# Patient Record
Sex: MALE | NOT HISPANIC OR LATINO | URBAN - METROPOLITAN AREA
[De-identification: names, ages, dates, MRNs, and addresses within clinical notes are randomized per-mention and may not be internally consistent; named-entity substitution may affect disease eponyms.]

---

## 2020-11-11 ENCOUNTER — IMPORTED ENCOUNTER (OUTPATIENT)
Dept: URBAN - METROPOLITAN AREA CLINIC 1 | Facility: CLINIC | Age: 40
End: 2020-11-11

## 2020-11-11 PROBLEM — H43.813: Noted: 2020-11-11

## 2020-11-11 PROCEDURE — 92004 COMPRE OPH EXAM NEW PT 1/>: CPT

## 2020-11-11 PROCEDURE — 92015 DETERMINE REFRACTIVE STATE: CPT

## 2020-11-11 NOTE — PATIENT DISCUSSION
1.  PVD OU - RD precautions. Continue Patanol PRN for seasonal allergies  MRX for glasses given. Return for an appointment in 1 year 27 with Dr. Sherif Canseco.

## 2020-11-11 NOTE — PATIENT DISCUSSION
Continue Patanol PRN for seasonal allergies  MRX for glasses given. Return for an appointment in 1 year 27 with Dr. Janene Hines.

## 2021-11-17 ENCOUNTER — IMPORTED ENCOUNTER (OUTPATIENT)
Dept: URBAN - METROPOLITAN AREA CLINIC 1 | Facility: CLINIC | Age: 41
End: 2021-11-17

## 2021-11-17 PROBLEM — H43.393: Noted: 2021-11-17

## 2021-11-17 PROCEDURE — 99214 OFFICE O/P EST MOD 30 MIN: CPT

## 2021-11-17 PROCEDURE — 92015 DETERMINE REFRACTIVE STATE: CPT

## 2021-11-17 NOTE — PATIENT DISCUSSION
1.  Vitreous Floaters OU -- RD precautions. MRX for glasses given. Return for an appointment in 1 year 27 with Dr. Samantha Nuno.

## 2022-04-02 ASSESSMENT — TONOMETRY
OD_IOP_MMHG: 9
OS_IOP_MMHG: 8
OD_IOP_MMHG: 8
OS_IOP_MMHG: 9

## 2022-04-02 ASSESSMENT — VISUAL ACUITY
OD_SC: J1
OS_SC: J1
OS_CC: 20/20
OD_CC: J1+
OS_CC: J1+
OS_CC: 20/20
OD_CC: 20/20
OD_CC: 20/20

## 2022-11-30 ENCOUNTER — COMPREHENSIVE EXAM (OUTPATIENT)
Dept: URBAN - METROPOLITAN AREA CLINIC 2 | Facility: CLINIC | Age: 42
End: 2022-11-30

## 2022-11-30 DIAGNOSIS — H43.393: ICD-10-CM

## 2022-11-30 DIAGNOSIS — D23.111: ICD-10-CM

## 2022-11-30 PROCEDURE — 99214 OFFICE O/P EST MOD 30 MIN: CPT

## 2022-11-30 ASSESSMENT — VISUAL ACUITY
OD_SC: 20/20
OD_CC: J1
OS_CC: J1
OS_SC: 20/20

## 2022-11-30 ASSESSMENT — TONOMETRY
OD_IOP_MMHG: 10
OS_IOP_MMHG: 10

## 2022-11-30 NOTE — PATIENT DISCUSSION
Appears benign. Measurements( 0.5mm) were taken and observation at regular intervals was recommended. The patient was asked to report if there is any growth.